# Patient Record
Sex: MALE | Race: WHITE | NOT HISPANIC OR LATINO
[De-identification: names, ages, dates, MRNs, and addresses within clinical notes are randomized per-mention and may not be internally consistent; named-entity substitution may affect disease eponyms.]

---

## 2024-01-23 ENCOUNTER — DASHBOARD ENCOUNTERS (OUTPATIENT)
Age: 59
End: 2024-01-23

## 2024-01-23 ENCOUNTER — OFFICE VISIT (OUTPATIENT)
Dept: URBAN - METROPOLITAN AREA CLINIC 23 | Facility: CLINIC | Age: 59
End: 2024-01-23
Payer: COMMERCIAL

## 2024-01-23 VITALS
HEIGHT: 70 IN | SYSTOLIC BLOOD PRESSURE: 130 MMHG | HEART RATE: 69 BPM | BODY MASS INDEX: 35.22 KG/M2 | WEIGHT: 246 LBS | DIASTOLIC BLOOD PRESSURE: 83 MMHG | TEMPERATURE: 97.9 F

## 2024-01-23 DIAGNOSIS — K21.9 ACID REFLUX: ICD-10-CM

## 2024-01-23 DIAGNOSIS — Z90.49 S/P CHOLECYSTECTOMY: ICD-10-CM

## 2024-01-23 DIAGNOSIS — K76.0 FATTY LIVER: ICD-10-CM

## 2024-01-23 DIAGNOSIS — R10.84 ABDOMINAL CRAMPING, GENERALIZED: ICD-10-CM

## 2024-01-23 PROCEDURE — 99204 OFFICE O/P NEW MOD 45 MIN: CPT | Performed by: NURSE PRACTITIONER

## 2024-01-23 RX ORDER — HYOSCYAMINE SULFATE 0.12 MG/1
1 TABLET UNDER THE TONGUE AND ALLOW TO DISSOLVE AS NEEDED TABLET, ORALLY DISINTEGRATING ORAL THREE TIMES A DAY
Qty: 15 | Refills: 0 | OUTPATIENT
Start: 2024-01-23 | End: 2024-01-28

## 2024-01-23 RX ORDER — EZETIMIBE 10 MG/1
1 TABLET TABLET ORAL ONCE A DAY
Status: ACTIVE | COMMUNITY

## 2024-01-23 NOTE — HPI-TODAY'S VISIT:
59 yo male s/p cholecystectomy in October 2023 presents for fatty liver. CT a/p 8/29/23 revealed fatty liver. He drinks 2-3 times per week. No recent labs. Reports reflux, abd bloating and gas, taking otc omeprazole bid prn with relief. He feels generally well. However, He has intermittent severe right side mid abd pain that last about 20-40min that has happened about 5 times last 5 months. Denies any associated symptoms. Denies any gi symptoms currently. No fever, chills, abd pain, nausea, vomiting, dysphagia, melena, hematochezia or weight loss. Last colonoscopy in 2019 diverticulosis. Last EGD over 10 years ago hh per patient.

## 2024-01-25 ENCOUNTER — TELEPHONE ENCOUNTER (OUTPATIENT)
Dept: URBAN - METROPOLITAN AREA CLINIC 111 | Facility: CLINIC | Age: 59
End: 2024-01-25

## 2024-01-25 PROBLEM — 197321007: Status: ACTIVE | Noted: 2024-01-25

## 2024-01-27 PROBLEM — 428882003: Status: ACTIVE | Noted: 2024-01-27

## 2024-01-27 PROBLEM — 235595009: Status: ACTIVE | Noted: 2024-01-27
